# Patient Record
Sex: MALE | Race: ASIAN | NOT HISPANIC OR LATINO
[De-identification: names, ages, dates, MRNs, and addresses within clinical notes are randomized per-mention and may not be internally consistent; named-entity substitution may affect disease eponyms.]

---

## 2017-03-02 ENCOUNTER — HOSPITAL (OUTPATIENT)
Dept: OTHER | Age: 42
End: 2017-03-02
Attending: HOSPITALIST

## 2018-04-13 PROCEDURE — 86480 TB TEST CELL IMMUN MEASURE: CPT | Performed by: PHYSICIAN ASSISTANT

## 2019-02-28 PROCEDURE — 86480 TB TEST CELL IMMUN MEASURE: CPT | Performed by: PHYSICIAN ASSISTANT

## 2022-07-19 ENCOUNTER — HOSPITAL ENCOUNTER (EMERGENCY)
Facility: HOSPITAL | Age: 47
Discharge: HOME | End: 2022-07-19
Attending: EMERGENCY MEDICINE
Payer: COMMERCIAL

## 2022-07-19 VITALS
SYSTOLIC BLOOD PRESSURE: 154 MMHG | BODY MASS INDEX: 33.51 KG/M2 | RESPIRATION RATE: 15 BRPM | HEIGHT: 68 IN | HEART RATE: 71 BPM | OXYGEN SATURATION: 98 % | DIASTOLIC BLOOD PRESSURE: 96 MMHG | WEIGHT: 221.12 LBS | TEMPERATURE: 98.2 F

## 2022-07-19 DIAGNOSIS — J02.9 PHARYNGITIS: Primary | ICD-10-CM

## 2022-07-19 LAB
ALBUMIN SERPL-MCNC: 4.1 G/DL (ref 3.5–5.3)
ALP SERPL-CCNC: 60 U/L (ref 45–115)
ALT SERPL-CCNC: 28 U/L (ref 7–52)
ANION GAP SERPL CALC-SCNC: 7 MMOL/L (ref 3–11)
ANISOCYTOSIS PRESENCE IN BLOOD, ANALYZER: ABNORMAL
AST SERPL-CCNC: 23 U/L
BASOPHILS # BLD AUTO: 0.1 10*3/UL
BASOPHILS NFR BLD AUTO: 0.8 % (ref 0–2)
BILIRUB SERPL-MCNC: 0.33 MG/DL (ref 0.2–1.4)
BNP SERPL-MCNC: 10 PG/ML (ref 0–100)
BUN SERPL-MCNC: 20 MG/DL (ref 7–25)
CALCIUM ALBUM COR SERPL-MCNC: 8.9 MG/DL (ref 8.6–10.3)
CALCIUM SERPL-MCNC: 9 MG/DL (ref 8.6–10.3)
CHLORIDE SERPL-SCNC: 107 MMOL/L (ref 98–107)
CO2 SERPL-SCNC: 25 MMOL/L (ref 21–32)
CREAT SERPL-MCNC: 1.23 MG/DL (ref 0.7–1.3)
EOSINOPHIL # BLD AUTO: 0.2 10*3/UL
EOSINOPHIL NFR BLD AUTO: 4 % (ref 0–3)
ERYTHROCYTE [DISTWIDTH] IN BLOOD BY AUTOMATED COUNT: 18.8 % (ref 11.5–15)
GFR SERPL CREATININE-BSD FRML MDRD: 74 ML/MIN/1.73M*2
GLUCOSE SERPL-MCNC: 111 MG/DL (ref 70–105)
GP B STREP AG SPEC QL: NEGATIVE
HCT VFR BLD AUTO: 40.7 % (ref 38–50)
HGB BLD-MCNC: 13.2 G/DL (ref 13.2–17.2)
HYPOCHROMIA PRESENCE IN BLOOD, ANALYZER: ABNORMAL
LYMPHOCYTES # BLD AUTO: 1.9 10*3/UL
LYMPHOCYTES NFR BLD AUTO: 30.1 % (ref 15–47)
MCH RBC QN AUTO: 21.7 PG (ref 29–34)
MCHC RBC AUTO-ENTMCNC: 32.3 G/DL (ref 32–36)
MCV RBC AUTO: 67.2 FL (ref 82–97)
MICROCYTOSIS PRESENCE IN BLOOD, ANALYZER: ABNORMAL
MONOCYTES # BLD AUTO: 0.8 10*3/UL
MONOCYTES NFR BLD AUTO: 12.9 % (ref 5–13)
NEUTROPHILS # BLD AUTO: 3.2 10*3/UL
NEUTROPHILS NFR BLD AUTO: 52.2 % (ref 46–70)
PLATELET # BLD AUTO: 236 10*3/UL (ref 130–350)
PMV BLD AUTO: 8.5 FL (ref 6.9–10.8)
POTASSIUM SERPL-SCNC: 3.8 MMOL/L (ref 3.5–5.1)
PROT SERPL-MCNC: 6.5 G/DL (ref 6–8.3)
RBC # BLD AUTO: 6.06 10*6/ΜL (ref 4.1–5.8)
SODIUM SERPL-SCNC: 139 MMOL/L (ref 135–145)
TROPONIN I SERPL-MCNC: <2.3 PG/ML
WBC # BLD AUTO: 6.2 10*3/UL (ref 3.7–9.6)

## 2022-07-19 PROCEDURE — 87070 CULTURE OTHR SPECIMN AEROBIC: CPT | Performed by: EMERGENCY MEDICINE

## 2022-07-19 PROCEDURE — 87880 STREP A ASSAY W/OPTIC: CPT | Mod: QW | Performed by: EMERGENCY MEDICINE

## 2022-07-19 PROCEDURE — 2500000200 HC RX 250 WO HCPCS: Performed by: EMERGENCY MEDICINE

## 2022-07-19 PROCEDURE — 84484 ASSAY OF TROPONIN QUANT: CPT | Performed by: EMERGENCY MEDICINE

## 2022-07-19 PROCEDURE — 80053 COMPREHEN METABOLIC PANEL: CPT | Performed by: EMERGENCY MEDICINE

## 2022-07-19 PROCEDURE — 36415 COLL VENOUS BLD VENIPUNCTURE: CPT | Performed by: EMERGENCY MEDICINE

## 2022-07-19 PROCEDURE — 2590000100 HC RX 259: Performed by: EMERGENCY MEDICINE

## 2022-07-19 PROCEDURE — 85025 COMPLETE CBC W/AUTO DIFF WBC: CPT | Performed by: EMERGENCY MEDICINE

## 2022-07-19 PROCEDURE — 83880 ASSAY OF NATRIURETIC PEPTIDE: CPT | Performed by: EMERGENCY MEDICINE

## 2022-07-19 PROCEDURE — 6360000200 HC RX 636 W HCPCS (ALT 250 FOR IP): Performed by: EMERGENCY MEDICINE

## 2022-07-19 PROCEDURE — 99285 EMERGENCY DEPT VISIT HI MDM: CPT | Performed by: EMERGENCY MEDICINE

## 2022-07-19 PROCEDURE — 93005 ELECTROCARDIOGRAM TRACING: CPT

## 2022-07-19 RX ORDER — DEXAMETHASONE SODIUM PHOSPHATE 4 MG/ML
10 INJECTION, SOLUTION INTRA-ARTICULAR; INTRALESIONAL; INTRAMUSCULAR; INTRAVENOUS; SOFT TISSUE ONCE
Status: DISCONTINUED | OUTPATIENT
Start: 2022-07-19 | End: 2022-07-19

## 2022-07-19 RX ORDER — DEXAMETHASONE 4 MG/1
10 TABLET ORAL ONCE
Status: COMPLETED | OUTPATIENT
Start: 2022-07-19 | End: 2022-07-19

## 2022-07-19 RX ADMIN — LIDOCAINE HYDROCHLORIDE 45 ML: 20 SOLUTION ORAL; TOPICAL at 04:07

## 2022-07-19 RX ADMIN — DEXAMETHASONE 10 MG: 4 TABLET ORAL at 04:27

## 2022-07-19 ASSESSMENT — ENCOUNTER SYMPTOMS
ARTHRALGIAS: 0
HEMATURIA: 0
FEVER: 0
SEIZURES: 0
BACK PAIN: 0
CHILLS: 0
PALPITATIONS: 0
DYSURIA: 0
SORE THROAT: 1
VOMITING: 0
EYE PAIN: 0
ABDOMINAL PAIN: 0
SHORTNESS OF BREATH: 0
COUGH: 0
COLOR CHANGE: 0

## 2022-07-19 NOTE — ED PROVIDER NOTES
HPI:  Chief Complaint   Patient presents with   • Shortness of Breath     Pt states he woke up with sob and a dry throat pt states he feels anxious ekg in triage      HPI    46-year-old male presents emergency department with complaints of sore throat.  Patient states he feels he may have reflux as his throat is burning.  Patient also states that he feels in his chest as well.  Patient denies history of cardiac disease.  Patient reports discomfort 4 out of 5 dull nonradiating, said shortness of breath in triage however denies shortness of breath with me.  Patient has no diaphoresis nonexertional no increasing or decreasing factors.  Patient denies voice changes.  No fevers or chills or postnasal drip.  No complaints of abdominal pain.    HISTORY:  No past medical history on file.    No past surgical history on file.    No family history on file.         ROS:  Review of Systems   Constitutional: Negative for chills and fever.   HENT: Positive for sore throat. Negative for ear pain.    Eyes: Negative for pain and visual disturbance.   Respiratory: Negative for cough and shortness of breath.    Cardiovascular: Negative for chest pain and palpitations.   Gastrointestinal: Negative for abdominal pain and vomiting.   Genitourinary: Negative for dysuria and hematuria.   Musculoskeletal: Negative for arthralgias and back pain.   Skin: Negative for color change and rash.   Neurological: Negative for seizures and syncope.   All other systems reviewed and are negative.       PHYSICAL EXAM:  Physical Exam  Vitals and nursing note reviewed.   Constitutional:       Appearance: He is well-developed.   HENT:      Head: Normocephalic and atraumatic.      Mouth/Throat:      Mouth: Mucous membranes are moist.      Pharynx: Oropharynx is clear.      Comments: Erythema tonsils are not present.  Eyes:      Extraocular Movements: Extraocular movements intact.      Conjunctiva/sclera: Conjunctivae normal.      Pupils: Pupils are equal,  round, and reactive to light.   Cardiovascular:      Rate and Rhythm: Normal rate and regular rhythm.      Heart sounds: No murmur heard.  Pulmonary:      Effort: Pulmonary effort is normal. No respiratory distress.      Breath sounds: Normal breath sounds.   Abdominal:      Palpations: Abdomen is soft.      Tenderness: There is no abdominal tenderness.   Musculoskeletal:      Cervical back: Normal range of motion and neck supple.      Right lower leg: No tenderness. No edema.      Left lower leg: No tenderness. No edema.   Lymphadenopathy:      Cervical: No cervical adenopathy.   Skin:     General: Skin is warm and dry.      Capillary Refill: Capillary refill takes less than 2 seconds.   Neurological:      General: No focal deficit present.      Mental Status: He is alert and oriented to person, place, and time.          Results for orders placed or performed during the hospital encounter of 07/19/22   CBC w/auto differential Blood, Venous   Result Value Ref Range    WBC 6.2 3.7 - 9.6 10*3/uL    RBC 6.06 (H) 4.10 - 5.80 10*6/µL    Hemoglobin 13.2 13.2 - 17.2 g/dL    Hematocrit 40.7 38.0 - 50.0 %    MCV 67.2 (L) 82.0 - 97.0 fL    MCH 21.7 (L) 29.0 - 34.0 pg    MCHC 32.3 32.0 - 36.0 g/dL    RDW 18.8 (H) 11.5 - 15.0 %    Platelets 236 130 - 350 10*3/uL    MPV 8.5 6.9 - 10.8 fL    Neutrophils% 52.2 46.0 - 70.0 %    Lymphocytes% 30.1 15.0 - 47.0 %    Monocytes% 12.9 5.0 - 13.0 %    Eosinophils% 4.0 (H) 0.0 - 3.0 %    Basophils% 0.8 0.0 - 2.0 %    ANC (auto diff) 3.20 10*3/UL    Lymphocytes Absolute 1.90 10*3/uL    Monocytes Absolute 0.80 10*3/uL    Eosinophils Absolute 0.20 10*3/uL    Basophils Absolute 0.10 10*3/uL    Anisocytosis 1+     Microcytosis 3+     Hypochromia 3+    Comprehensive metabolic panel Blood, Venous   Result Value Ref Range    Sodium 139 135 - 145 mmol/L    Potassium 3.8 3.5 - 5.1 MMOL/L    Chloride 107 98 - 107 mmol/L    CO2 25 21 - 32 mmol/L    Anion Gap 7 3 - 11 mmol/L    BUN 20 7 - 25 mg/dL     Creatinine 1.23 0.70 - 1.30 mg/dL    Glucose 111 (H) 70 - 105 mg/dL    Calcium 9.0 8.6 - 10.3 mg/dL    AST 23 <40 U/L    ALT (SGPT) 28 7 - 52 U/L    Alkaline Phosphatase 60 45 - 115 U/L    Total Protein 6.5 6.0 - 8.3 g/dL    Albumin 4.1 3.5 - 5.3 g/dL    Total Bilirubin 0.33 0.20 - 1.40 mg/dL    Corrected Calcium 8.9 8.6 - 10.3 mg/dL    eGFR 74 >60 mL/min/1.73m*2   HS Troponin I   Result Value Ref Range    hsTnI 0 Hour <2.3 <=19.8 pg/mL   B-type natriuretic peptide (BNP) Blood, Venous   Result Value Ref Range    BNP 10 0 - 100 pg/mL   Rapid Strep Screen Swab   Result Value Ref Range    Strep A Screen Negative Negative       MDM:     46-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  EKG was obtained which shows normal sinus rhythm without any ischemic findings.  Patient declined chest x-ray so it was canceled due to my concerns for possible cardiac disease.  Cardiac biomarkers x1 was not elevated.  Rapid strep testing was negative culture pending, BNP was not elevated, comprehensive metabolic panel significant only for glucose of 111 otherwise normal.  CBC no elevated white count no anemia platelets were normal.    Patient was given a GI cocktail as well as Decadron with improvement of his pharyngitis.    I discussed with patient my findings and that no acute medical condition exists at this time and he is safe for discharge.  I did recommend if he had worsening symptoms upon his return to [place] to be reevaluated by his doctor or if they worsen prior to return to [place] return to the emergency department for evaluation.    Labs Reviewed   CBC WITH AUTO DIFFERENTIAL - Abnormal       Result Value    WBC 6.2      RBC 6.06 (*)     Hemoglobin 13.2      Hematocrit 40.7      MCV 67.2 (*)     MCH 21.7 (*)     MCHC 32.3      RDW  18.8 (*)     Platelets 236      MPV 8.5      Neutrophils% 52.2      Lymphocytes% 30.1      Monocytes% 12.9      Eosinophils% 4.0 (*)     Basophils% 0.8      ANC (auto diff) 3.20      Lymphocytes Absolute 1.90      Monocytes Absolute 0.80      Eosinophils Absolute 0.20      Basophils Absolute 0.10      Anisocytosis 1+      Microcytosis 3+      Hypochromia 3+     COMPREHENSIVE METABOLIC PANEL - Abnormal    Sodium 139      Potassium 3.8      Chloride 107      CO2 25      Anion Gap 7      BUN 20      Creatinine 1.23      Glucose 111 (*)     Calcium 9.0      AST 23      ALT (SGPT) 28      Alkaline Phosphatase 60      Total Protein 6.5      Albumin 4.1      Total Bilirubin 0.33      Corrected Calcium 8.9      eGFR 74      Narrative:     Calculation based on the 2021 Chronic Kidney Disease Epidemiology Collaboration (CKD-EPI) equation refit without adjustment for race.   B-TYPE NATRIURETIC PEPTIDE (BNP) - Normal    BNP 10         No orders to display       ED Medication Administration from 07/19/2022 0238 to 07/19/2022 0431       Date/Time Order Dose Route Action Action by     07/19/2022 0407 antacid/lidocaine 2% viscous (GI COCKTAIL KIT) 45 mL suspension 45 mL oral Given HADLEY Peñaloza     07/19/2022 0411 dexamethasone (DECADRON) injection 10 mg 10 mg intravenous Not Given HADLEY Peñaloza     07/19/2022 0427 dexAMETHasone (DECADRON) tablet 10 mg 10 mg oral Given HADLEY Peñaloza          PROCEDURES:  Procedures    ED COURSE:     Recent Results (from the past 24 hour(s))   CBC w/auto differential Blood, Venous    Collection Time: 07/19/22  3:25 AM   Result Value Ref Range    WBC 6.2 3.7 - 9.6 10*3/uL    RBC 6.06 (H) 4.10 - 5.80 10*6/µL    Hemoglobin 13.2 13.2 - 17.2 g/dL    Hematocrit 40.7 38.0 - 50.0 %    MCV 67.2 (L) 82.0 - 97.0 fL    MCH 21.7 (L) 29.0 - 34.0 pg    MCHC 32.3 32.0 - 36.0 g/dL    RDW 18.8 (H) 11.5 - 15.0 %    Platelets 236 130 - 350 10*3/uL    MPV 8.5 6.9 - 10.8 fL    Neutrophils% 52.2 46.0 - 70.0 %    Lymphocytes% 30.1  15.0 - 47.0 %    Monocytes% 12.9 5.0 - 13.0 %    Eosinophils% 4.0 (H) 0.0 - 3.0 %    Basophils% 0.8 0.0 - 2.0 %    ANC (auto diff) 3.20 10*3/UL    Lymphocytes Absolute 1.90 10*3/uL    Monocytes Absolute 0.80 10*3/uL    Eosinophils Absolute 0.20 10*3/uL    Basophils Absolute 0.10 10*3/uL    Anisocytosis 1+     Microcytosis 3+     Hypochromia 3+    Comprehensive metabolic panel Blood, Venous    Collection Time: 07/19/22  3:25 AM   Result Value Ref Range    Sodium 139 135 - 145 mmol/L    Potassium 3.8 3.5 - 5.1 MMOL/L    Chloride 107 98 - 107 mmol/L    CO2 25 21 - 32 mmol/L    Anion Gap 7 3 - 11 mmol/L    BUN 20 7 - 25 mg/dL    Creatinine 1.23 0.70 - 1.30 mg/dL    Glucose 111 (H) 70 - 105 mg/dL    Calcium 9.0 8.6 - 10.3 mg/dL    AST 23 <40 U/L    ALT (SGPT) 28 7 - 52 U/L    Alkaline Phosphatase 60 45 - 115 U/L    Total Protein 6.5 6.0 - 8.3 g/dL    Albumin 4.1 3.5 - 5.3 g/dL    Total Bilirubin 0.33 0.20 - 1.40 mg/dL    Corrected Calcium 8.9 8.6 - 10.3 mg/dL    eGFR 74 >60 mL/min/1.73m*2   HS Troponin I    Collection Time: 07/19/22  3:25 AM   Result Value Ref Range    hsTnI 0 Hour <2.3 <=19.8 pg/mL   B-type natriuretic peptide (BNP) Blood, Venous    Collection Time: 07/19/22  3:25 AM   Result Value Ref Range    BNP 10 0 - 100 pg/mL   Rapid Strep Screen Swab    Collection Time: 07/19/22  4:15 AM   Result Value Ref Range    Strep A Screen Negative Negative              CLINICAL IMPRESSION:  Final diagnoses:   [J02.9] Pharyngitis       I, Dr. Shannon, personally performed the services described in this documentation as described by rocio in my presence and it is both accurate and complete.     A voice recognition program was used to aid in documentation of this record.  Sometimes words are not printed exactly as they were spoken.  While efforts were made to carefully edit and correct any inaccuracies, some areas may be present; please take these into context.  Please contact the provider if areas are identified.        Jagjit Shannon DO  07/19/22 0750

## 2022-07-19 NOTE — DISCHARGE INSTRUCTIONS
Rapid strep testing is pending if it is positive we will call you.  Recommend that you follow-up with your doctor when you return to [place] in the next 8 days.  If you have worsening of symptoms please return the emergency department for evaluation.

## 2022-07-19 NOTE — ED PROCEDURE NOTE
Procedure  ECG 12 lead, Shortness of breath    Date/Time: 7/19/2022 3:52 AM  Performed by: Jagjit Shannon DO  Authorized by: Jagjit Shannon DO     Comments:      Sinus rhythm rate 70, , QRS 86, QTc 408, normal axis, no acute ST depression or elevation.                 Jagjit Shannon DO  07/19/22 0353

## 2022-07-21 LAB — BACTERIA THROAT CULT: NORMAL
